# Patient Record
Sex: MALE | Race: BLACK OR AFRICAN AMERICAN | NOT HISPANIC OR LATINO | Employment: FULL TIME | URBAN - METROPOLITAN AREA
[De-identification: names, ages, dates, MRNs, and addresses within clinical notes are randomized per-mention and may not be internally consistent; named-entity substitution may affect disease eponyms.]

---

## 2025-06-07 ENCOUNTER — APPOINTMENT (OUTPATIENT)
Dept: RADIOLOGY | Facility: MEDICAL CENTER | Age: 30
End: 2025-06-07
Payer: COMMERCIAL

## 2025-06-07 ENCOUNTER — OFFICE VISIT (OUTPATIENT)
Dept: URGENT CARE | Facility: MEDICAL CENTER | Age: 30
End: 2025-06-07
Payer: COMMERCIAL

## 2025-06-07 VITALS
RESPIRATION RATE: 18 BRPM | OXYGEN SATURATION: 100 % | TEMPERATURE: 97.5 F | SYSTOLIC BLOOD PRESSURE: 138 MMHG | DIASTOLIC BLOOD PRESSURE: 85 MMHG | HEART RATE: 69 BPM

## 2025-06-07 DIAGNOSIS — M79.644 THUMB PAIN, RIGHT: ICD-10-CM

## 2025-06-07 DIAGNOSIS — S63.641A SPRAIN OF METACARPOPHALANGEAL (MCP) JOINT OF RIGHT THUMB, INITIAL ENCOUNTER: Primary | ICD-10-CM

## 2025-06-07 PROCEDURE — G0382 LEV 3 HOSP TYPE B ED VISIT: HCPCS

## 2025-06-07 PROCEDURE — S9083 URGENT CARE CENTER GLOBAL: HCPCS

## 2025-06-07 PROCEDURE — 73140 X-RAY EXAM OF FINGER(S): CPT

## 2025-06-07 PROCEDURE — 29125 APPL SHORT ARM SPLINT STATIC: CPT

## 2025-06-07 NOTE — PATIENT INSTRUCTIONS
Your finalized x-ray results will be available on [employer] when read by the radiologist.  Recommend rest, ice, elevation.   Over the counter pain medication as directed on packaging as needed for pain (ex: Tylenol, ibuprofen).  Utilize thumb brace for support.  Referral to orthopedics placed for follow up.    Follow up with PCP in 3-5 days.  Proceed to  ER if symptoms worsen.    If tests are performed, our office will contact you with results only if changes need to made to the care plan discussed with you at the visit. You can review your full results on [facility].    Patient Education     Sprained Thumb   About this topic   A sprained thumb is when the ligaments that hold the bones in your thumb together are stretched or torn. Your thumb is made up of a few small bones. Ligaments are strong bands of tissue that hold your bones together. There are also muscles and tendons in your thumb. These attach to the bones and help move your thumb up, down, or sideways.  What are the causes?   Trauma from a fall, direct hit, accident, fight  Thumb is twisted, pulled, or jammed  What can make this more likely to happen?   You are more likely to sprain your thumb if you:  Have a job where your hands are at risk for injury  Have a job where you do repetitive motions with your hands  Play contact sports or sports where you are at risk of a fall  Fall on an outstretched hand  Fall while you ski and your hand is attached to the pole  What are the main signs?   Your thumb may be sore or swollen. You may have more pain when you move your thumb. Your skin may look bruised and you may have trouble with your .  How does the doctor diagnose this health problem?   Your doctor will check your thumb and hand. Your doctor will have you try to move your thumb to check your motion. Your doctor may push on your thumb to test your strength. Your doctor will also check for numbness and blood flow. The doctor may order:  X-ray  MRI or  CT scan  How does the doctor treat this health problem?   Your doctor may want you to use a splint or brace to limit how much you can use and move your thumb. You may also need to use ice to help with swelling. The doctor may need to do surgery for a very bad injury. You may need physical or occupational therapy to help your thumb move.  What drugs may be needed?   The doctor may order drugs to:  Help with pain and swelling, like ibuprofen (Advil, Motrin). These are nonsteroidal anti-inflammatory drugs (NSAIDS).  Help with pain, such as acetaminophen (Tylenol)  Treat or prevent infections  The doctor may give you a shot of an anti-inflammatory drug called a corticosteroid. This will help with swelling. Talk with your doctor about the risks of this shot.  What problems could happen?   Loss of thumb movement or strength  Pain or stiffness continues  Problems when you try to grasp or pinch  Long-term disability  Injury to nerves, blood vessels, or other tissues  Arthritis  What can be done to prevent this health problem?   Wear protective equipment when you play sports.  Take rests often when you do repetitive hand motions.  Last Reviewed Date   2020-04-30  Consumer Information Use and Disclaimer   This generalized information is a limited summary of diagnosis, treatment, and/or medication information. It is not meant to be comprehensive and should be used as a tool to help the user understand and/or assess potential diagnostic and treatment options. It does NOT include all information about conditions, treatments, medications, side effects, or risks that may apply to a specific patient. It is not intended to be medical advice or a substitute for the medical advice, diagnosis, or treatment of a health care provider based on the health care provider's examination and assessment of a patient’s specific and unique circumstances. Patients must speak with a health care provider for complete information about their health,  medical questions, and treatment options, including any risks or benefits regarding use of medications. This information does not endorse any treatments or medications as safe, effective, or approved for treating a specific patient. [employer] and its affiliates disclaim any warranty or liability relating to this information or the use thereof. The use of this information is governed by the Terms of Use, available at https://www.[employer].com/en/know/clinical-effectiveness-terms   Copyright   Copyright © 2024 [employer] and its affiliates and/or licensors. All rights reserved.

## 2025-06-07 NOTE — PROGRESS NOTES
[facility]        NAME: Jose Medellin is a 30 y.o. male  : 1995    MRN: [id number]  DATE: 2025  TIME: 4:13 PM    Assessment and Plan   Sprain of metacarpophalangeal (MCP) joint of right thumb, initial encounter [S63.641A]  1. Sprain of metacarpophalangeal (MCP) joint of right thumb, initial encounter  Ambulatory Referral to Orthopedic Surgery    Orthopedic injury treatment    Splint      2. Thumb pain, right  XR thumb right first digit-min 2v        Right thumb x-ray: No acute osseous abnormality per preliminary read.  Radiology to determine final read.    Presentation consistent with first MCP joint sprain.  Advised symptomatic treatment.  Placed in thumb-o-prene splint today by provider.    Patient Instructions     Your finalized x-ray results will be available on [employer] when read by the radiologist.  Recommend rest, ice, elevation.   Over the counter pain medication as directed on packaging as needed for pain (ex: Tylenol, ibuprofen).  Utilize thumb brace for support.  Referral to orthopedics placed for follow up.    Follow up with PCP in 3-5 days.  Proceed to  ER if symptoms worsen.    If tests are performed, our office will contact you with results only if changes need to made to the care plan discussed with you at the visit. You can review your full results on [facility].    Chief Complaint     Chief Complaint   Patient presents with    Thumb Pain     Patient c/o right thumb with palm swelling after it was bent backwards when playing basketball.          History of Present Illness       Patient presents with girlfriend for evaluation of right thumb and hand pain.  States last night around 9:30 PM he was playing basketball when his right thumb was jammed and bent backwards.  States throughout the night he had some pain to the area.  This morning he woke up and noticed swelling and bruising.  He notes stiffness in the area, difficulty moving due to the swelling.    Hand  Injury   The incident occurred 12 to 24 hours ago. Incident location: playing basketball. The injury mechanism was a direct blow. The pain is present in the right fingers and right hand (Right thumb and palm). The pain does not radiate. The pain is at a severity of 8/10. Pertinent negatives include no numbness or tingling. The symptoms are aggravated by movement and palpation. Treatments tried: Motrin. The treatment provided mild (for a few hours) relief.       Review of Systems   Review of Systems   Musculoskeletal:  Positive for arthralgias and joint swelling.   Skin:  Positive for color change.   Neurological:  Negative for tingling and numbness.         Current Medications     Current Medications[1]    Current Allergies     Allergies as of 06/07/2025 - never reviewed   Allergen Reaction Noted    No known allergies Other (See Comments) 10/24/2017            The following portions of the patient's history were reviewed and updated as appropriate: allergies, current medications, past family history, past medical history, past social history, past surgical history and problem list.     Past Medical History[2]    Past Surgical History[3]    Family History[4]      Medications have been verified.        Objective   /85   Pulse 69   Temp 97.5 °F (36.4 °C)   Resp 18   SpO2 100%        Physical Exam     Physical Exam  Vitals and nursing note reviewed.   Constitutional:       General: He is not in acute distress.     Appearance: Normal appearance. He is not ill-appearing.     Cardiovascular:      Rate and Rhythm: Normal rate and regular rhythm.      Pulses: Normal pulses.      Heart sounds: Normal heart sounds. No murmur heard.  Pulmonary:      Effort: Pulmonary effort is normal. No respiratory distress.      Breath sounds: Normal breath sounds. No stridor. No wheezing, rhonchi or rales.     Musculoskeletal:      Right wrist: No swelling, deformity, effusion, lacerations, tenderness, bony tenderness or snuff box  "tenderness. Normal pulse.      Right hand: Swelling, tenderness (thenar eminence) and bony tenderness (MCP joint) present. No deformity. Decreased range of motion (first digit). Normal sensation. Normal capillary refill.     Neurological:      Mental Status: He is alert.         Orthopedic injury treatment    Date/Time: 6/7/2025 4:05 PM    Performed by: Ashlee Amador PA-C  Authorized by: Ashlee Amador PA-C    Patient Location:  [facility]    [place] Protocol:  procedure performed by consultantConsent: Verbal consent obtained  Risks and benefits: risks, benefits and alternatives were discussed  Consent given by: patient  Time out: Immediately prior to procedure a \"time out\" was called to verify the correct patient, procedure, equipment, support staff and site/side marked as required.  Patient understanding: patient states understanding of the procedure being performed  Patient identity confirmed: verbally with patient    Injury location:  Finger  Location details:  Right thumb  Injury type:  Soft tissue  Neurovascular status: Neurovascularly intact    Distal perfusion: normal    Neurological function: normal    Range of motion: normal    Local anesthesia used?: No    General anesthesia used?: No    Immobilization:  Brace  Splint type: Thumb-o-prene, static.  Neurovascular status: Neurovascularly intact    Distal perfusion: normal    Neurological function: normal    Range of motion: normal    Patient tolerance:  Patient tolerated the procedure well with no immediate complications                   [1] No current outpatient medications on file.  [2] No past medical history on file.  [3] No past surgical history on file.  [4] No family history on file.    "